# Patient Record
Sex: FEMALE | Race: WHITE | Employment: OTHER | URBAN - METROPOLITAN AREA
[De-identification: names, ages, dates, MRNs, and addresses within clinical notes are randomized per-mention and may not be internally consistent; named-entity substitution may affect disease eponyms.]

---

## 2023-01-16 RX ORDER — GLIPIZIDE 5 MG/1
5 TABLET ORAL
COMMUNITY

## 2023-01-16 RX ORDER — MULTIVIT WITH MINERALS/LUTEIN
1000 TABLET ORAL DAILY
COMMUNITY

## 2023-01-16 RX ORDER — CYCLOSPORINE 0.5 MG/ML
1 EMULSION OPHTHALMIC 2 TIMES DAILY
COMMUNITY

## 2023-01-16 RX ORDER — LEVOTHYROXINE SODIUM 0.1 MG/1
100 TABLET ORAL DAILY
COMMUNITY

## 2023-01-16 RX ORDER — SPIRONOLACTONE 25 MG/1
25 TABLET ORAL DAILY
COMMUNITY

## 2023-01-16 RX ORDER — MAGNESIUM OXIDE 400 MG/1
400 TABLET ORAL DAILY
COMMUNITY

## 2023-01-16 RX ORDER — MELOXICAM 15 MG/1
15 TABLET ORAL NIGHTLY
COMMUNITY

## 2023-01-16 RX ORDER — ATENOLOL AND CHLORTHALIDONE TABLET 100; 25 MG/1; MG/1
1 TABLET ORAL DAILY
COMMUNITY

## 2023-01-16 RX ORDER — ATORVASTATIN CALCIUM 20 MG/1
20 TABLET, FILM COATED ORAL NIGHTLY
COMMUNITY

## 2023-01-18 ENCOUNTER — ANESTHESIA (OUTPATIENT)
Dept: OPERATING ROOM | Age: 73
End: 2023-01-18
Payer: MEDICARE

## 2023-01-18 ENCOUNTER — HOSPITAL ENCOUNTER (OUTPATIENT)
Age: 73
Setting detail: OUTPATIENT SURGERY
Discharge: HOME | End: 2023-01-18
Attending: OPHTHALMOLOGY | Admitting: OPHTHALMOLOGY
Payer: MEDICARE

## 2023-01-18 ENCOUNTER — ANESTHESIA EVENT (OUTPATIENT)
Dept: OPERATING ROOM | Age: 73
End: 2023-01-18
Payer: MEDICARE

## 2023-01-18 VITALS
DIASTOLIC BLOOD PRESSURE: 63 MMHG | OXYGEN SATURATION: 99 % | TEMPERATURE: 96.8 F | BODY MASS INDEX: 31.02 KG/M2 | RESPIRATION RATE: 14 BRPM | HEIGHT: 60 IN | SYSTOLIC BLOOD PRESSURE: 163 MMHG | HEART RATE: 68 BPM | WEIGHT: 158 LBS

## 2023-01-18 LAB
EGFR, POC: >60 ML/MIN/1.73M2
EKG ATRIAL RATE: 69 BPM
EKG P AXIS: 32 DEGREES
EKG P-R INTERVAL: 118 MS
EKG Q-T INTERVAL: 396 MS
EKG QRS DURATION: 82 MS
EKG QTC CALCULATION (BAZETT): 424 MS
EKG R AXIS: 13 DEGREES
EKG T AXIS: 23 DEGREES
EKG VENTRICULAR RATE: 69 BPM
GLUCOSE BLD-MCNC: 167 MG/DL (ref 65–105)
GLUCOSE BLD-MCNC: 174 MG/DL (ref 74–100)
POC BUN: 30 MG/DL (ref 8–26)
POC CREATININE: 0.98 MG/DL (ref 0.51–1.19)
POC POTASSIUM: 3.4 MMOL/L (ref 3.5–4.5)

## 2023-01-18 PROCEDURE — 3600000014 HC SURGERY LEVEL 4 ADDTL 15MIN: Performed by: OPHTHALMOLOGY

## 2023-01-18 PROCEDURE — 2580000003 HC RX 258: Performed by: OPHTHALMOLOGY

## 2023-01-18 PROCEDURE — 3700000001 HC ADD 15 MINUTES (ANESTHESIA): Performed by: OPHTHALMOLOGY

## 2023-01-18 PROCEDURE — 82565 ASSAY OF CREATININE: CPT

## 2023-01-18 PROCEDURE — 6360000002 HC RX W HCPCS: Performed by: OPHTHALMOLOGY

## 2023-01-18 PROCEDURE — 2709999900 HC NON-CHARGEABLE SUPPLY: Performed by: OPHTHALMOLOGY

## 2023-01-18 PROCEDURE — 84520 ASSAY OF UREA NITROGEN: CPT

## 2023-01-18 PROCEDURE — 93010 ELECTROCARDIOGRAM REPORT: CPT | Performed by: INTERNAL MEDICINE

## 2023-01-18 PROCEDURE — 2500000003 HC RX 250 WO HCPCS: Performed by: OPHTHALMOLOGY

## 2023-01-18 PROCEDURE — 7100000011 HC PHASE II RECOVERY - ADDTL 15 MIN: Performed by: OPHTHALMOLOGY

## 2023-01-18 PROCEDURE — 2500000003 HC RX 250 WO HCPCS: Performed by: NURSE ANESTHETIST, CERTIFIED REGISTERED

## 2023-01-18 PROCEDURE — 3600000004 HC SURGERY LEVEL 4 BASE: Performed by: OPHTHALMOLOGY

## 2023-01-18 PROCEDURE — 3700000000 HC ANESTHESIA ATTENDED CARE: Performed by: OPHTHALMOLOGY

## 2023-01-18 PROCEDURE — 6360000002 HC RX W HCPCS: Performed by: NURSE ANESTHETIST, CERTIFIED REGISTERED

## 2023-01-18 PROCEDURE — 82947 ASSAY GLUCOSE BLOOD QUANT: CPT

## 2023-01-18 PROCEDURE — 7100000010 HC PHASE II RECOVERY - FIRST 15 MIN: Performed by: OPHTHALMOLOGY

## 2023-01-18 PROCEDURE — 6370000000 HC RX 637 (ALT 250 FOR IP): Performed by: OPHTHALMOLOGY

## 2023-01-18 PROCEDURE — 2580000003 HC RX 258: Performed by: ANESTHESIOLOGY

## 2023-01-18 PROCEDURE — 93005 ELECTROCARDIOGRAM TRACING: CPT | Performed by: ANESTHESIOLOGY

## 2023-01-18 PROCEDURE — 2720000010 HC SURG SUPPLY STERILE: Performed by: OPHTHALMOLOGY

## 2023-01-18 PROCEDURE — 84132 ASSAY OF SERUM POTASSIUM: CPT

## 2023-01-18 RX ORDER — PHENYLEPHRINE HYDROCHLORIDE 100 MG/ML
1 SOLUTION/ DROPS OPHTHALMIC
Status: COMPLETED | OUTPATIENT
Start: 2023-01-18 | End: 2023-01-18

## 2023-01-18 RX ORDER — CYCLOPENTOLATE HYDROCHLORIDE 10 MG/ML
SOLUTION/ DROPS OPHTHALMIC PRN
Status: DISCONTINUED | OUTPATIENT
Start: 2023-01-18 | End: 2023-01-18 | Stop reason: HOSPADM

## 2023-01-18 RX ORDER — SODIUM CHLORIDE 0.9 % (FLUSH) 0.9 %
SYRINGE (ML) INJECTION PRN
Status: DISCONTINUED | OUTPATIENT
Start: 2023-01-18 | End: 2023-01-18 | Stop reason: HOSPADM

## 2023-01-18 RX ORDER — PROPOFOL 10 MG/ML
INJECTION, EMULSION INTRAVENOUS PRN
Status: DISCONTINUED | OUTPATIENT
Start: 2023-01-18 | End: 2023-01-18 | Stop reason: SDUPTHER

## 2023-01-18 RX ORDER — BALANCED SALT SOLUTION ENRICHED WITH BICARBONATE, DEXTROSE, AND GLUTATHIONE
KIT INTRAOCULAR PRN
Status: DISCONTINUED | OUTPATIENT
Start: 2023-01-18 | End: 2023-01-18 | Stop reason: HOSPADM

## 2023-01-18 RX ORDER — FENTANYL CITRATE 50 UG/ML
INJECTION, SOLUTION INTRAMUSCULAR; INTRAVENOUS PRN
Status: DISCONTINUED | OUTPATIENT
Start: 2023-01-18 | End: 2023-01-18 | Stop reason: SDUPTHER

## 2023-01-18 RX ORDER — TROPICAMIDE 10 MG/ML
1 SOLUTION/ DROPS OPHTHALMIC
Status: COMPLETED | OUTPATIENT
Start: 2023-01-18 | End: 2023-01-18

## 2023-01-18 RX ORDER — CYCLOPENTOLATE HYDROCHLORIDE 10 MG/ML
1 SOLUTION/ DROPS OPHTHALMIC
Status: COMPLETED | OUTPATIENT
Start: 2023-01-18 | End: 2023-01-18

## 2023-01-18 RX ORDER — LIDOCAINE HYDROCHLORIDE 10 MG/ML
INJECTION, SOLUTION EPIDURAL; INFILTRATION; INTRACAUDAL; PERINEURAL PRN
Status: DISCONTINUED | OUTPATIENT
Start: 2023-01-18 | End: 2023-01-18 | Stop reason: SDUPTHER

## 2023-01-18 RX ORDER — INDOCYANINE GREEN AND WATER 25 MG
KIT INJECTION PRN
Status: DISCONTINUED | OUTPATIENT
Start: 2023-01-18 | End: 2023-01-18 | Stop reason: HOSPADM

## 2023-01-18 RX ORDER — SODIUM CHLORIDE 0.9 % (FLUSH) 0.9 %
5-40 SYRINGE (ML) INJECTION PRN
Status: DISCONTINUED | OUTPATIENT
Start: 2023-01-18 | End: 2023-01-18 | Stop reason: HOSPADM

## 2023-01-18 RX ORDER — VANCOMYCIN HYDROCHLORIDE 500 MG/10ML
INJECTION, POWDER, LYOPHILIZED, FOR SOLUTION INTRAVENOUS PRN
Status: DISCONTINUED | OUTPATIENT
Start: 2023-01-18 | End: 2023-01-18 | Stop reason: HOSPADM

## 2023-01-18 RX ORDER — SODIUM CHLORIDE 0.9 % (FLUSH) 0.9 %
5-40 SYRINGE (ML) INJECTION EVERY 12 HOURS SCHEDULED
Status: DISCONTINUED | OUTPATIENT
Start: 2023-01-18 | End: 2023-01-18 | Stop reason: HOSPADM

## 2023-01-18 RX ORDER — SODIUM CHLORIDE 9 MG/ML
INJECTION, SOLUTION INTRAVENOUS PRN
Status: DISCONTINUED | OUTPATIENT
Start: 2023-01-18 | End: 2023-01-18 | Stop reason: HOSPADM

## 2023-01-18 RX ORDER — WATER 1000 ML/1000ML
INJECTION, SOLUTION INTRAVENOUS PRN
Status: DISCONTINUED | OUTPATIENT
Start: 2023-01-18 | End: 2023-01-18 | Stop reason: HOSPADM

## 2023-01-18 RX ORDER — OFLOXACIN 3 MG/ML
1 SOLUTION/ DROPS OPHTHALMIC
Status: COMPLETED | OUTPATIENT
Start: 2023-01-18 | End: 2023-01-18

## 2023-01-18 RX ORDER — DEXTROSE MONOHYDRATE 50 MG/ML
INJECTION, SOLUTION INTRAVENOUS CONTINUOUS PRN
Status: DISCONTINUED | OUTPATIENT
Start: 2023-01-18 | End: 2023-01-18 | Stop reason: HOSPADM

## 2023-01-18 RX ORDER — SODIUM CHLORIDE, SODIUM LACTATE, POTASSIUM CHLORIDE, CALCIUM CHLORIDE 600; 310; 30; 20 MG/100ML; MG/100ML; MG/100ML; MG/100ML
INJECTION, SOLUTION INTRAVENOUS CONTINUOUS
Status: DISCONTINUED | OUTPATIENT
Start: 2023-01-18 | End: 2023-01-18 | Stop reason: HOSPADM

## 2023-01-18 RX ORDER — NEOMYCIN SULFATE, POLYMYXIN B SULFATE, AND DEXAMETHASONE 3.5; 10000; 1 MG/G; [USP'U]/G; MG/G
OINTMENT OPHTHALMIC PRN
Status: DISCONTINUED | OUTPATIENT
Start: 2023-01-18 | End: 2023-01-18 | Stop reason: HOSPADM

## 2023-01-18 RX ADMIN — TROPICAMIDE 1 DROP: 10 SOLUTION/ DROPS OPHTHALMIC at 07:52

## 2023-01-18 RX ADMIN — FENTANYL CITRATE 50 MCG: 50 INJECTION, SOLUTION INTRAMUSCULAR; INTRAVENOUS at 09:32

## 2023-01-18 RX ADMIN — CYCLOPENTOLATE HYDROCHLORIDE 1 DROP: 10 SOLUTION/ DROPS OPHTHALMIC at 07:35

## 2023-01-18 RX ADMIN — OFLOXACIN 1 DROP: 3 SOLUTION OPHTHALMIC at 08:01

## 2023-01-18 RX ADMIN — LIDOCAINE HYDROCHLORIDE 50 MG: 10 INJECTION, SOLUTION EPIDURAL; INFILTRATION; INTRACAUDAL; PERINEURAL at 08:36

## 2023-01-18 RX ADMIN — CYCLOPENTOLATE HYDROCHLORIDE 1 DROP: 10 SOLUTION/ DROPS OPHTHALMIC at 07:48

## 2023-01-18 RX ADMIN — TROPICAMIDE 1 DROP: 10 SOLUTION/ DROPS OPHTHALMIC at 07:32

## 2023-01-18 RX ADMIN — PROPOFOL 30 MG: 10 INJECTION, EMULSION INTRAVENOUS at 08:38

## 2023-01-18 RX ADMIN — PHENYLEPHRINE HYDROCHLORIDE 1 DROP: 100 SOLUTION/ DROPS OPHTHALMIC at 08:01

## 2023-01-18 RX ADMIN — PROPOFOL 50 MG: 10 INJECTION, EMULSION INTRAVENOUS at 08:36

## 2023-01-18 RX ADMIN — OFLOXACIN 1 DROP: 3 SOLUTION OPHTHALMIC at 07:51

## 2023-01-18 RX ADMIN — TROPICAMIDE 1 DROP: 10 SOLUTION/ DROPS OPHTHALMIC at 08:01

## 2023-01-18 RX ADMIN — PHENYLEPHRINE HYDROCHLORIDE 1 DROP: 100 SOLUTION/ DROPS OPHTHALMIC at 07:35

## 2023-01-18 RX ADMIN — OFLOXACIN 1 DROP: 3 SOLUTION OPHTHALMIC at 07:35

## 2023-01-18 RX ADMIN — SODIUM CHLORIDE, POTASSIUM CHLORIDE, SODIUM LACTATE AND CALCIUM CHLORIDE: 600; 310; 30; 20 INJECTION, SOLUTION INTRAVENOUS at 08:30

## 2023-01-18 RX ADMIN — OFLOXACIN 1 DROP: 3 SOLUTION OPHTHALMIC at 08:17

## 2023-01-18 RX ADMIN — PHENYLEPHRINE HYDROCHLORIDE 1 DROP: 100 SOLUTION/ DROPS OPHTHALMIC at 07:52

## 2023-01-18 RX ADMIN — TROPICAMIDE 1 DROP: 10 SOLUTION/ DROPS OPHTHALMIC at 08:17

## 2023-01-18 RX ADMIN — CYCLOPENTOLATE HYDROCHLORIDE 1 DROP: 10 SOLUTION/ DROPS OPHTHALMIC at 08:17

## 2023-01-18 RX ADMIN — CYCLOPENTOLATE HYDROCHLORIDE 1 DROP: 10 SOLUTION/ DROPS OPHTHALMIC at 08:01

## 2023-01-18 ASSESSMENT — PAIN DESCRIPTION - ORIENTATION: ORIENTATION: LEFT

## 2023-01-18 ASSESSMENT — PAIN DESCRIPTION - LOCATION: LOCATION: EYE

## 2023-01-18 ASSESSMENT — PAIN SCALES - GENERAL
PAINLEVEL_OUTOF10: 2

## 2023-01-18 ASSESSMENT — PAIN - FUNCTIONAL ASSESSMENT
PAIN_FUNCTIONAL_ASSESSMENT: 0-10
PAIN_FUNCTIONAL_ASSESSMENT: 0-10

## 2023-01-18 ASSESSMENT — PAIN DESCRIPTION - DESCRIPTORS: DESCRIPTORS: SORE

## 2023-01-18 NOTE — BRIEF OP NOTE
Brief Postoperative Note      Patient: Whitley Abarca  YOB: 1950  MRN: [id number]    Date of Procedure: 1/18/2023    Pre-Op Diagnosis: MACULAR PUCKER LEFT EYE    Post-Op Diagnosis: Same       Procedure(s):  PARS PLANA VITRECTOMY 25 GAUGE, MEMBRANE PEEL, AIR FLUID EXCHANGE, ICG    Surgeon(s):  Izabel Hancock MD    Assistant:  * No surgical staff found *    Anesthesia: Monitor Anesthesia Care    Estimated Blood Loss (mL): Minimal    Complications: None    Specimens:   * No specimens in log *    Implants:  * No implants in log *      Drains: * No LDAs found *    Findings:     Electronically signed by Izabel Hancock MD on 1/18/2023 at 10:03 AM

## 2023-01-18 NOTE — H&P
History and Physical    Pt Name: Shorty Hamilton  MRN: [id number]  YOB: 1950  Date of evaluation: 1/18/2023  Primary Care Physician: Joey Cui DO    SUBJECTIVE:   History of Chief Complaint:    Shorty Hamilton is a 71 y.o. female who is scheduled today for PARS PLANA VITRECTOMY 25 GAUGE, MEMBRANE PEEL, ICG - Left. She complains of macular pucker, describes her vision as \"distorted, wavy words and a film\" over her vision. She states this has been ongoing since 2018 but gradually worse. Allergies  is allergic to codeine. Medications  Prior to Admission medications    Medication Sig Start Date End Date Taking? Authorizing Provider   atenolol-chlorthalidone (TENORETIC) 100-25 MG per tablet Take 1 tablet by mouth daily Takes after lunch   Yes Historical Provider, MD   atorvastatin (LIPITOR) 20 MG tablet Take 20 mg by mouth at bedtime   Yes Historical Provider, MD   levothyroxine (SYNTHROID) 100 MCG tablet Take 100 mcg by mouth Daily   Yes Historical Provider, MD   glipiZIDE (GLUCOTROL) 5 MG tablet Take 5 mg by mouth 2 times daily (before meals)   Yes Historical Provider, MD   meloxicam (MOBIC) 15 MG tablet Take 15 mg by mouth at bedtime   Yes Historical Provider, MD   SITagliptin (JANUVIA) 100 MG tablet Take 100 mg by mouth in the morning and 100 mg in the evening. Takes 1/2 tab (50mg) after lunch and 1/2 tab (50mg) dinner.    Yes Historical Provider, MD   spironolactone (ALDACTONE) 25 MG tablet Take 25 mg by mouth daily Takes after lunch   Yes Historical Provider, MD   cycloSPORINE (RESTASIS) 0.05 % ophthalmic emulsion Place 1 drop into both eyes 2 times daily   Yes Historical Provider, MD   polyethyl glycol-propyl glycol 0.4-0.3 % (SYSTANE) 0.4-0.3 % ophthalmic solution Place 1 drop into both eyes as needed for Dry Eyes   Yes Historical Provider, MD   magnesium oxide (MAG-OX) 400 MG tablet Take 400 mg by mouth daily Takes every 3-4 days   Yes Historical Provider, MD   Multiple Vitamins-Minerals (CENTRUM SILVER 50+WOMEN) TABS Take 1 tablet by mouth daily Takes in the afternoon   Yes Historical Provider, MD   Cholecalciferol (VITAMIN D3) 125 MCG (5000 UT) TABS Take 1 tablet by mouth daily Takes in the afternoon   Yes Historical Provider, MD   Ascorbic Acid (VITAMIN C) 1000 MG tablet Take 1,000 mg by mouth daily Takes in the afternoon   Yes Historical Provider, MD   NONFORMULARY Take 1 tablet by mouth daily B-Complex with Vit C and Zinc  Takes in the afternoon   Yes Historical Provider, MD     Past Medical History    has a past medical history of CAD (coronary artery disease), Cystitis, Diabetes mellitus ([facility] [address] Hiatal hernia, History of bilateral YAG laser iridotomy, Hyperlipidemia, Hypertension, Macular pucker, BRIAN on CPAP, Osteoarthritis, Thyroid disease, Urinary frequency, Visual distortion, Wears glasses, and Wellness examination. Past Surgical History   has a past surgical history that includes Total hip arthroplasty (Right, 2022); Total knee arthroplasty (Left, 2017); lumbar fusion (); other surgical history (Left, ); Cataract extraction w/ intraocular lens  implant, bilateral (2021); cardiovascular stress test (); and Cardiac catheterization (). Social History   reports that she has never smoked. She has never used smokeless tobacco.   reports that she does not currently use alcohol. reports no history of drug use. Marital Status    Children 2  Occupation retired   Family History  Family Status   Relation Name Status    Mother      Father       family history includes Glaucoma in her mother; Heart Disease in her father; Theresa Aren in her mother.     Review of Systems:  CONSTITUTIONAL:   negative for fevers, chills, fatigue and malaise    EYES:   +see HPI   HEENT:   negative for tinnitus, epistaxis and sore throat     RESPIRATORY:   negative for cough, shortness of breath, wheezing     CARDIOVASCULAR:   negative for chest pain, palpitations, syncope, edema     GASTROINTESTINAL:   negative for nausea, vomiting     GENITOURINARY:   negative for incontinence     MUSCULOSKELETAL:   negative for neck or back pain     NEUROLOGICAL:   Negative for weakness and tingling  negative for headaches and dizziness     PSYCHIATRIC:   negative for anxiety       OBJECTIVE:   VITALS:  height is 5' (1.524 m) and weight is 158 lb (71.7 kg). Her temporal temperature is 97.2 °F (36.2 °C). Her blood pressure is 155/76 (abnormal) and her pulse is 71. Her respiration is 16 and oxygen saturation is 94%. CONSTITUTIONAL:alert & oriented x 3, no acute distress. Calm and pleasant. SKIN:  Warm and dry, no rashes on exposed areas of skin   HEAD:  Normocephalic, atraumatic   EYES: PERRL. EOMs intact. Wearing glasses. EARS:  Equal bilaterally, no edema or thickening, skin is intact without lumps or lesions. No discharge. Hearing grossly WNL. NOSE:  Nares patent. No rhinorrhea   MOUTH/THROAT:  Mucous membranes moist, tongue is pink, uvula midline, teeth appear to be intact  NECK:Full ROM  LUNGS: Respirations even and non-labored. Clear to auscultation bilaterally, no wheezes, rales, or rhonchi. CARDIOVASCULAR: Regular rate and rhythm, no murmurs/rubs/gallops   ABDOMEN: soft, non-tender, non-distended, bowel sounds active x 4   EXTREMITIES: No edema bilateral lower extremities. No varicosities bilateral lower extremities. NEUROLOGIC: CN II-XII are grossly intact. Gait not assessed.   IMPRESSIONS:   Macular pucker, left  PLANS:   PARS PLANA VITRECTOMY 25 GAUGE, MEMBRANE PEEL, ICG - Left    MACK SCHAFFER - CNP   Electronically signed 1/18/2023 at 7:59 AM

## 2023-01-18 NOTE — DISCHARGE INSTRUCTIONS
No alcoholic beverages, no driving or operating machinery, no making important decisions for 24 hours. Children should maintain quiet play ( games, movies, books ) for 24 hours. You may have a normal diet but should eat lightly day of surgery. Drink plenty of fluids. Urinate within 8 hours after surgery, if unable to urinate call your doctor    Going Home Instructions and Medication Schedule  Use one drop in the operated eye(s) at each time marked with a CHECK  If you are using more than one kind of drop, allow three minutes between drops. Use Pain Medication Prescribed by your Surgeon    Positioning    []  Face Down     []  Left Side     []  Right Side     []  Either Side       [x]  Head Elevated on back at 60-90deg     []  Right Side Down          Going Home Instructions and Medication Schedule  Use one drop in the operated eye(s) at each time marked with a CHECK  If you are using more than one kind of drop, allow three minutes between drops. Use Pain Medication Prescribed by your Surgeon    Positioning    []  Face Down     []  Left Side     []  Right Side     []  Either Side       []  Head Elevated on back at 30 deg     []  Right Side Down     []  Left Side Down     LOOK DOWN  FOR 24 HRS       When Cleansing the Eye:  1. Wash you hands with soap and water. 2.  Open 2 X 2 dressing and eye patch, tear 6 inches along tape. 3.  Moisten 2 X 2 dressing with sterile irrigating solution. 4.  Gently cleanse eye with one 2 X 2 from inside corner of eye to outside edge. Then        repeat with second 2 X 2. Remember to only use 2 X 2 once. When Giving Eye Drops or Ointments:  1. Cleanse eye as above before giving drops or ointments. 2.  Have the patient look toward the ceiling with both eyes open. 3.  Pull the lower lid down - steady your hand on the patient's forehead. 4.  Put a drop of medicine or a small strip of ointment in the sac behind the       lashes of the lower lid,  5.   The tip of the dropper or ointment tube should not touch the eye itself. 6.  Don't give more than one eye medicine at a time. Wait about 3 minutes between           each. 7.  When using both ointment and drops, use the ointment after the drops. 8. If you put in your own drops, it may be easier to lie down. Ask a friend or relative to     make sure you actually get the medication into the eye. General Instruction:  1. Resume all medications taken before hospitalization. 2.  You may have occasional discomfort after returning home. Take 1 - 2 regular or               extra strength acetaminophen (Tylenol) or other non-aspirin pain relievers every 4 - 6       hours if needed for pain. 3.  If you experience severe pain which is not relieved by the above measures, or if you         develop excessive discharge, please call our office. 4.  For any other problems or questions that arise, please call our office. 5. Bring all post op drops given to you after surgery to your post -op appointment tomorrow . Do not use any drops until you are given instructions  by Dr Alex Crabtree 's staff tomorrow. Please bring this instruction sheet and your medication with you to your office  Visit.     Name of Drop Cap Color 8:00 AM  Breakfast 12:00 PM  Lunch 6:00 PM  Supper 10:00 PM  Bedtime   Acular/Nevanac Benson   []     []         []         []         Alphagan Purple    []        []         []         []         Cosopt White/Blue   []         []         []         []         Econopred Plus Harpster   []         []         []         []         Lotemax Pink   []         []         []         []         Lumigan Teal   []         []         []         []         Ocuflox/Ciloxan Cohn   []         []         []         []         Pred G White   []         []         []         []         Timolol/Betimol Yellow   []         []         []         []         Travatan/Xalatan Teal   []         []         []         []         Trusopt Orange   [] []         []         []         Vigamox Cohn   []         []         []         []         ATROPINE Red   []         []         []         []         Ointment    []         []         []         [x]           Additional Instructions and Information:    1.  fI gas was put in your eye during you operation, airplane travel is not permitted until this is approved by your doctor. If you expect to undergo any procedures requiring anesthesia, including dental procedures requiring nitrous gas, notify your doctor                immediately. 2.  If the operated eye is comfortable, you may go without an eye patch. You should wear your regular glasses or sunglasses during the day, which will provide protection for the eye. If you were given an eye shield protector, wear this at night over the operate eye. 3.  Light flashes or other visula symptoms are commin during the post-operative period. Both eyes can be light sensitive and neither condition is cause for alarm. Dark glasses may be helpful in decreasing light sensitivity. Activities May Be Resumed According to the Following Schedule: Activity Time After [facility] or shower with care to keep soap away from eyes Immediately   Shaving Immediately   Careful walking outdoors with a  (weather permitting) Immediately   Reading or watching television Immediately   Riding in a car Immediately   Shampooing hair (head held back _____  Immediately         _____ 2 days   Routine household chores (no scrubbing floors or lifting heavy object)  One Week   Haircut or appointment at beauty shop _____  Immediately         _____One Week   Return to employment Ask Physician   Driving Ask Physician     Any Activity That Makes [facility] to the Head May Cause Eye Discomfort: The following activities should be avoided:  A. Lifting over 20 pounds (about a case of soda)  B. Sever physical exertion  C.   Bending forward (keep the head upright and bend at the knees  D.   Straining with bowel movement (use mild laxative or jermaine softener for constipation)    []  Left Side Down     LOOK DOWN  FOR 24 HRS

## 2023-01-18 NOTE — ANESTHESIA PRE PROCEDURE
Department of Anesthesiology  Preprocedure Note       Name:  Lefty Murray   Age:  71 y.o.  :  1950                                          MRN:  [id number]         Date:  2023      Surgeon: David Moreauor):  Rachel Ziegler MD    Procedure: Procedure(s):  PARS PLANA VITRECTOMY 25 GAUGE, MEMBRANE PEEL, ICG    Medications prior to admission:   Prior to Admission medications    Medication Sig Start Date End Date Taking? Authorizing Provider   atenolol-chlorthalidone (TENORETIC 100) 100-25 MG per tablet Take 1 tablet by mouth daily Takes after lunch   Yes Historical Provider, MD   atorvastatin (LIPITOR) 20 MG tablet Take 20 mg by mouth at bedtime   Yes Historical Provider, MD   levothyroxine (SYNTHROID) 100 MCG tablet Take 100 mcg by mouth Daily   Yes Historical Provider, MD   glipiZIDE (GLUCOTROL) 5 MG tablet Take 5 mg by mouth 2 times daily (before meals)   Yes Historical Provider, MD   meloxicam (MOBIC) 15 MG tablet Take 15 mg by mouth at bedtime   Yes Historical Provider, MD   SITagliptin (JANUVIA) 100 MG tablet Take 100 mg by mouth in the morning and 100 mg in the evening. Takes 1/2 tab (50mg) after lunch and 1/2 tab (50mg) dinner.    Yes Historical Provider, MD   spironolactone (ALDACTONE) 25 MG tablet Take 25 mg by mouth daily Takes after lunch   Yes Historical Provider, MD   cycloSPORINE (RESTASIS) 0.05 % ophthalmic emulsion Place 1 drop into both eyes 2 times daily   Yes Historical Provider, MD   polyethyl glycol-propyl glycol 0.4-0.3 % (SYSTANE) 0.4-0.3 % ophthalmic solution Place 1 drop into both eyes as needed for Dry Eyes   Yes Historical Provider, MD   magnesium oxide (MAG-OX) 400 MG tablet Take 400 mg by mouth daily Takes every 3-4 days   Yes Historical Provider, MD   Multiple Vitamins-Minerals (CENTRUM SILVER 50+WOMEN) TABS Take 1 tablet by mouth daily Takes in the afternoon   Yes Historical Provider, MD   Cholecalciferol (VITAMIN D3) 125 MCG (5000 UT) TABS Take 1 tablet by mouth daily Takes in the afternoon   Yes Historical Provider, MD   Ascorbic Acid (VITAMIN C) 1000 MG tablet Take 1,000 mg by mouth daily Takes in the afternoon   Yes Historical Provider, MD   NONFORMULARY Take 1 tablet by mouth daily B-Complex with Vit C and Zinc  Takes in the afternoon   Yes Historical Provider, MD       Current medications:    Current Facility-Administered Medications   Medication Dose Route Frequency Provider Last Rate Last Admin    ofloxacin (OCUFLOX) 0.3 % solution 1 drop  1 drop Left Eye Q15 Min Prosper Johnson MD        tropicamide (MYDRIACYL) 1 % ophthalmic solution 1 drop  1 drop Left Eye Q15 Min Prosper Johnson MD        phenylephrine (CHARLIE-SYNEPHRINE) 10 % ophthalmic solution 1 drop  1 drop Left Eye Q15 Min Prosper Johnson MD        cyclopentolate (CYCLOGYL) 1 % ophthalmic solution 1 drop  1 drop Left Eye Q15 Min Prosper Johnson MD           Allergies: Allergies   Allergen Reactions    Codeine Nausea And Vomiting     Sweats, feel weird         Problem List:  There is no problem list on file for this patient.       Past Medical History:        Diagnosis Date    CAD (coronary artery disease)     Cystitis     Diabetes mellitus ([address]     Hiatal hernia     History of bilateral YAG laser iridotomy 11/2022    in office    Hyperlipidemia     Hypertension     Macular pucker     BRIAN on CPAP     Osteoarthritis     Thyroid disease     Urinary frequency     Visual distortion     Wears glasses     Wellness examination     PCP Arturo Crawford / [facility] / last visit 12/5/22       Past Surgical History:        Procedure Laterality Date    CATARACT EXTRACTION W/ INTRAOCULAR LENS  IMPLANT, BILATERAL  07/2021    LUMBAR FUSION  2012    L4-5    OTHER SURGICAL HISTORY Left 1990    elbow    TOTAL HIP ARTHROPLASTY Right 04/2022    TOTAL KNEE ARTHROPLASTY Left 04/2017       Social History:    Social History     Tobacco Use    Smoking status: Never    Smokeless tobacco: Never   Substance Use Topics   • Alcohol use: Not Currently                                Counseling given: Not Answered      Vital Signs (Current):   Vitals:    01/16/23 0914   Weight: 158 lb (71.7 kg)   Height: 5' (1.524 m)                                              BP Readings from Last 3 Encounters:   No data found for BP       NPO Status:                                                                                 BMI:   Wt Readings from Last 3 Encounters:   01/16/23 158 lb (71.7 kg)     Body mass index is 30.86 kg/m².    CBC: No results found for: WBC, RBC, HGB, HCT, MCV, RDW, PLT    CMP: No results found for: NA, K, CL, CO2, BUN, CREATININE, GFRAA, AGRATIO, LABGLOM, GLUCOSE, GLU, PROT, CALCIUM, BILITOT, ALKPHOS, AST, ALT    POC Tests: No results for input(s): POCGLU, POCNA, POCK, POCCL, POCBUN, POCHEMO, POCHCT in the last 72 hours.    Coags: No results found for: PROTIME, INR, APTT    HCG (If Applicable): No results found for: PREGTESTUR, PREGSERUM, HCG, HCGQUANT     ABGs: No results found for: PHART, PO2ART, OFE7GOI, EDM6JSM, BEART, L8FJSWNG     Type & Screen (If Applicable):  No results found for: LABABO, LABRH    Drug/Infectious Status (If Applicable):  No results found for: HIV, HEPCAB    COVID-19 Screening (If Applicable): No results found for: COVID19        Anesthesia Evaluation     history of anesthetic complications: PONV.  Airway: Mallampati: II     Neck ROM: full     Dental: normal exam         Pulmonary: breath sounds clear to auscultation  (+) sleep apnea: on CPAP,                             Cardiovascular:    (+) hypertension:, CAD:, hyperlipidemia        Rhythm: regular  Rate: normal                 ROS comment: •  Normally contractile left ventricle.   •  There are no significant valve abnormalities.   •  Mild-to-moderate pulmonary hypertension is present.        Neuro/Psych:   Negative Neuro/Psych ROS              GI/Hepatic/Renal:   (+) hiatal hernia,           Endo/Other:    (+)  Diabetes, : arthritis:., .                 Abdominal:         (-) obese       Vascular: negative vascular ROS. Other Findings:           Anesthesia Plan      MAC     ASA 3       Induction: intravenous. Anesthetic plan and risks discussed with patient. Plan discussed with CRNA.                     Shane Whittington MD   1/18/2023

## 2023-01-18 NOTE — ANESTHESIA POSTPROCEDURE EVALUATION
Department of Anesthesiology  Postprocedure Note    Patient: Jose Roberto Dixon  MRN: [id number]  YOB: 1950  Date of evaluation: 1/18/2023      Procedure Summary     Date: 01/18/23 Room / Location: [address]    Anesthesia Start: 0830 Anesthesia Stop: 1159    Procedure: PARS PLANA VITRECTOMY 25 GAUGE, MEMBRANE PEEL, AIR FLUID EXCHANGE, ICG (Left: Eye) Diagnosis:       Epiretinal membrane (ERM) of left eye      (MACULAR PUCKER LEFT EYE)    Surgeons: Abraham Cai MD Responsible Provider: Олег Castro MD    Anesthesia Type: MAC ASA Status: 3          Anesthesia Type: No value filed.     Demetria Phase I: Demetria Score: 10    Demetria Phase II: Demetria Score: 10      Anesthesia Post Evaluation    Patient location during evaluation: PACU  Patient participation: complete - patient participated  Level of consciousness: awake and alert  Pain score: 2  Airway patency: patent  Nausea & Vomiting: no nausea and no vomiting  Complications: no  Cardiovascular status: hemodynamically stable  Respiratory status: acceptable  Hydration status: euvolemic

## 2023-01-19 NOTE — OP NOTE
[address] [facility] [address]                                OPERATIVE REPORT    PATIENT NAME: Lynne Bustillo                    :        1950  MED REC NO:   [id number]                             ROOM:  ACCOUNT NO:   [de-identified]                           ADMIT DATE: 2023  PROVIDER:     Cary Han    DATE OF PROCEDURE:  2023    PREOPERATIVE DIAGNOSIS:  Left macular pucker. POSTOPERATIVE DIAGNOSIS:  Left macular pucker. OPERATIONS PERFORMED:  Left pars plana vitrectomy, membrane stripping,  air-fluid exchange. SURGEON:  Cary Mendoza. Nuala Sacks, MD    COMPLICATIONS:  None. ANESTHETIC:  MAC. BLOOD LOSS:  Zero. INDICATIONS OF SURGERY:  The above patient has decline in vision in the  left eye due to the macular pucker. She has trouble reading, driving,  and functioning with activities of daily living. She requests surgery  to improve her vision. I discussed risks and benefits of surgery in  detail including possibility of infection, glaucoma, bleeding, failure  of vision to improve, chronic macular edema, retinal detachment, loss of  vision, blindness, loss of eye. The patient understood these risks and  was willing to proceed with surgery. OPERATIVE PROCEDURE:  After informed consent was obtained, the patient  underwent a left retrobulbar block. A total of 8 mL of 2% lidocaine was  used with 0.75% Marcaine in a 50:50 mixture without epinephrine. The  patient's left eye was draped and prepped in the usual sterile fashion. In anticipation for a 25-gauge surgery, Betadine was placed in the  conjunctiva as prophylaxis against infection. Measuring 3.5 mm  posterior to the limbus in the inferotemporal quadrant, trocars were  then hooked in place and then, care was taken to ensure the infusion  cannula was seen in the vitreous cavity prior to turning the infusion  cannula on.   It was then turned on to 35. Similar maneuver was done  superotemporally and superonasally by placing trocars 3.5 mm posterior  to the limbus. Using a BIOM lens system, core vitrectomy was carried out. There was  definite PVD. The posterior hyaloid was removed in a posterior-anterior  fashion for 360 degrees and then, because the macular pucker was very  tight, several drops of ICG and 0.1% solution with D5W only, no BSS was  placed over the posterior pole and was allowed to stain for  approximately 5 seconds and vacuumed off with a silicone-tipped  extrusion needle. Once faint staining was achieved, I used a pancake  contact lens and made a scratch down in the ILM and the ERM. The ERM  and ILM were removed from the macula in one sheet. The entire maneuver  took approximately 3 minutes. Light was at 30, far away from the  macula. Peripheral examination and scleral depression did not show any  retinal hole, tear, detachment, or dialysis. At this point, a partial  air-fluid exchange was carried out to prevent postoperative hypotony and  decrease risk of infection. The two superior trocars were removed. These were airtight. The infusion cannula was removed. This was  airtight as well. Subconjunctival vancomycin was given superiorly and  inferiorly. Antibiotic ointment was placed and the eye was patched and  shielded. The patient returned to the recovery room in satisfactory  condition.         Cordell Luo    D: 01/19/2023 0:06:21       T: 01/19/2023 4:46:31     [email or web address]  Job#: [id number]     Doc#: [id number]    CC:

## (undated) DEVICE — RETINA PK

## (undated) DEVICE — SURGICAL PROCEDURE PACK 25 GA VITRECTOMY

## (undated) DEVICE — GLOVE SURG SZ 65 THK91MIL LTX FREE SYN POLYISOPRENE

## (undated) DEVICE — KIT,ANTI FOG,W/SPONGE & FLUID,SOFT PACK: Brand: MEDLINE

## (undated) DEVICE — COVER,MAYO STAND,STERILE: Brand: MEDLINE

## (undated) DEVICE — 25+® FINESSE® FLEX LOOP DSP: Brand: ALCON GRIESHABER 25+ FINESSE

## (undated) DEVICE — GLOVE ORANGE PI 7   MSG9070

## (undated) DEVICE — 1 EACH 40411 STERILE DISPOSABLE SUPER VIEW® LENS SET & 1 EACH 40100 STERILE MICROSCOPE DRAPE: Brand: SUPER VIEW® PACK

## (undated) DEVICE — DRESSING TRNSPAR W2XL2.75IN FLM SHT SEMIPERMEABLE WIND

## (undated) DEVICE — DRESSING TRNSPAR W5XL4.5IN FLM SHT SEMIPERMEABLE WIND

## (undated) DEVICE — SYRINGE MED 5ML STD CLR PLAS LUERLOCK TIP N CTRL DISP

## (undated) DEVICE — OCCUCOAT SYRINGE 1ML 6PK: Brand: OCCUCOAT

## (undated) DEVICE — DISPOSABLE BIPOLAR CABLE, 12FT (3.6M): Brand: KIRWAN

## (undated) DEVICE — GOWN,SIRUS,NONRNF,SETINSLV,XL,20/CS: Brand: MEDLINE

## (undated) DEVICE — CAUTERY BPLR 25GA INTOCU W/ HNDPC CRD DISP FOR CX9404

## (undated) DEVICE — 25GA EZPASS SOFT TIP CANNULA BOX OF 5: Brand: VORTEX SURGICAL INC

## (undated) DEVICE — REVOLUTION DSP 25G ILM FORCEPS: Brand: ALCON GRIESHABER REVOLUTION

## (undated) DEVICE — STANDARD HYPODERMIC NEEDLE,ALUMINUM HUB: Brand: MONOJECT

## (undated) DEVICE — 25 GA FLEXIBLE TIP LASER PROBE: Brand: ALCON, ENGAUGE

## (undated) DEVICE — GOWN,AURORA,NONREINFORCED,LARGE: Brand: MEDLINE

## (undated) DEVICE — SYRINGE, LUER LOCK, 10ML: Brand: MEDLINE

## (undated) DEVICE — 25GA RETRACTABLE ILM MEMBRANE ELEVATOR BOX OF 5: Brand: VORTEX SURGICAL INC